# Patient Record
Sex: FEMALE | Race: WHITE | NOT HISPANIC OR LATINO | URBAN - METROPOLITAN AREA
[De-identification: names, ages, dates, MRNs, and addresses within clinical notes are randomized per-mention and may not be internally consistent; named-entity substitution may affect disease eponyms.]

---

## 2023-11-21 ENCOUNTER — APPOINTMENT (RX ONLY)
Dept: URBAN - METROPOLITAN AREA CLINIC 352 | Facility: CLINIC | Age: 53
Setting detail: DERMATOLOGY
End: 2023-11-21

## 2023-11-21 DIAGNOSIS — I83.9 ASYMPTOMATIC VARICOSE VEINS OF LOWER EXTREMITIES: ICD-10-CM

## 2023-11-21 DIAGNOSIS — Z41.9 ENCOUNTER FOR PROCEDURE FOR PURPOSES OTHER THAN REMEDYING HEALTH STATE, UNSPECIFIED: ICD-10-CM

## 2023-11-21 PROBLEM — I83.92 ASYMPTOMATIC VARICOSE VEINS OF LEFT LOWER EXTREMITY: Status: ACTIVE | Noted: 2023-11-21

## 2023-11-21 PROCEDURE — ? COSMETIC CONSULTATION: SCLEROTHERAPY

## 2023-11-21 PROCEDURE — ? SCLEROTHERAPY (COSMETIC)

## 2023-11-21 ASSESSMENT — LOCATION DETAILED DESCRIPTION DERM
LOCATION DETAILED: LEFT ANTERIOR PROXIMAL THIGH
LOCATION DETAILED: LEFT ANTERIOR DISTAL THIGH
LOCATION DETAILED: RIGHT ANTERIOR PROXIMAL THIGH

## 2023-11-21 ASSESSMENT — LOCATION SIMPLE DESCRIPTION DERM
LOCATION SIMPLE: LEFT THIGH
LOCATION SIMPLE: RIGHT THIGH

## 2023-11-21 ASSESSMENT — LOCATION ZONE DERM: LOCATION ZONE: LEG

## 2023-11-21 NOTE — PROCEDURE: SCLEROTHERAPY (COSMETIC)
Sclerosant Volume (Cc): 3
Detail Level: Simple
Sclerosant Volume (Cc): 10
Price (Use Numbers Only, No Special Characters Or $): 75
Number Of Injections (Sclerosant 2): 0
Number Of Injections (Sclerosant 1): 1
Consent was obtained with risks, benefits, and alternatives discussed for the above procedures.  Photographs were taken.
Disposition: Test treatment performed
Post-Care Instructions: Compression for 3 weeks is critical to optimize your recovery and results. Compliance with compression helps to prevent blood clots and minimizes pain, swelling, bruising, skin discoloration (staining) and the recurrence of vessels.       \nMaterials to gather for your wound care (all available over the counter)      \nCompression stockings x 2 pairs, 4 x 4 gauze, Comprilan wrap: 8 cm and 10 cm width wrap, Medical adhesive tape       \nCompression and Wound care;  Leg compression for 3 weeks is garces to your success and safety. Compression at night is only needed the first day. After that, compression is needed only during waking hours.  However, if your leg feels better with compression at night, then you may continue compression at night as tolerated.       \nAfter the sclerotherapy procedure, 2 layers compression will be placed.       \n1. On the skin, folded or flat gauze will cover the treated areas.       \n2. A compression wrap (Comprilan) will be wrapped around your leg over the gauze. Once the compression wrap is in place, a compression stocking will be worn. This two layer  compression (wrap plus stocking) should be worn for the first 24 hours if tolerated.       \n3. After 24 hours, remove all compressions and dressings and just wear the compression stockings only during waking hours.        \nYou will need to wear compression stockings for three weeks after your procedure, unless your physician instructs you otherwise.       \nActivity       \n - It is important NOT to be sitting or lying down for several hours after surgery. You may begin walking immediately after surgery. This is good for you, but take it easy.       \n - For 2 days after treatment: Avoid aerobic exercise, weight training, and all other types of exertion that increase your breathing and pulse rates.       \n - Do not get a tan for one month after sclerotherapy. Tanning increases your risk of skin discoloration.      \nBathing       \nAfter 24 hours, you may shower or bathe in tepid water, but keep the compression stocking on. Avoid immersion in hot tubs.      \nDiscomfort . For pain or discomfort:       \n - You may take acetaminophen (TylenolTM, Extra-Strength TylenolTM or DatrilTM) as directed.       \n - Do not use aspirin, products containing aspirin, or ibuprofen (for example AdvilTM or MotrinTM) for five days after your surgery, unless approved by your physician.       \n - Dietary restrictions Do not drink alcoholic beverages for two days after your sclerotherapy procedure.       \nPossible side effects following sclerotherapy  After sclerotherapy, mild swelling is expected. The injection sites may also become bruised or gray. You may also experience one or more of the following side effects, which almost always go away within one to four months:       \n - Darkening of the injected veins       \n - Brownish staining of the skin       \n - Small clotted vessels under the surface of the skin that you can feel      \n - Bruising of the injection sites       \nWhat to do about bruising  This will resolve within 2-3 weeks. If you wish the bruising to disappear sooner, then applying Arnicare cream (over the counter, health food stores) will help.       \nWhat to do if you feel small clotted vessels under the surface of the skin.      \n - Call us for a follow up appointment. These small clots can be drained through a small nick.       \n - Draining these small clots will help you heal faster and with less discoloration.      \nWhen to contact your physician       \nContact your physician if you experience any of the following:       \n - Treated areas become increasingly sore, tender, red or warm       \n - Acetaminophen does not relieve your discomfort       \n - Injection sites turn black or the skin around the site breaks down       \n - Ulceration of the injection sites       \n - You develop blisters from the tape       \n - You develop significant swelling or pain in the leg       \n - Darkening of large areas of the skin or foot

## 2023-12-12 ENCOUNTER — APPOINTMENT (RX ONLY)
Dept: URBAN - METROPOLITAN AREA CLINIC 352 | Facility: CLINIC | Age: 53
Setting detail: DERMATOLOGY
End: 2023-12-12

## 2023-12-12 DIAGNOSIS — I83.9 ASYMPTOMATIC VARICOSE VEINS OF LOWER EXTREMITIES: ICD-10-CM

## 2023-12-12 PROBLEM — I83.92 ASYMPTOMATIC VARICOSE VEINS OF LEFT LOWER EXTREMITY: Status: ACTIVE | Noted: 2023-12-12

## 2023-12-12 PROCEDURE — ? SCLEROTHERAPY (COSMETIC)

## 2023-12-12 ASSESSMENT — LOCATION SIMPLE DESCRIPTION DERM: LOCATION SIMPLE: LEFT THIGH

## 2023-12-12 ASSESSMENT — LOCATION DETAILED DESCRIPTION DERM
LOCATION DETAILED: LEFT ANTERIOR DISTAL THIGH
LOCATION DETAILED: LEFT ANTERIOR PROXIMAL THIGH

## 2023-12-12 ASSESSMENT — LOCATION ZONE DERM: LOCATION ZONE: LEG

## 2023-12-12 NOTE — PROCEDURE: SCLEROTHERAPY (COSMETIC)
Sclerosant Volume (Cc): 2
Detail Level: Simple
Sclerosant Volume (Cc): 10
Price (Use Numbers Only, No Special Characters Or $): 400
Number Of Injections (Sclerosant 2): 0
Number Of Injections (Sclerosant 1): 1
Consent was obtained with risks, benefits, and alternatives discussed for the above procedures.  Photographs were taken.
Post-Care Instructions: Compression for 3 weeks is critical to optimize your recovery and results. Compliance with compression helps to prevent blood clots and minimizes pain, swelling, bruising, skin discoloration (staining) and the recurrence of vessels.       \nMaterials to gather for your wound care (all available over the counter)      \nCompression stockings x 2 pairs, 4 x 4 gauze, Comprilan wrap: 8 cm and 10 cm width wrap, Medical adhesive tape       \nCompression and Wound care;  Leg compression for 3 weeks is garces to your success and safety. Compression at night is only needed the first day. After that, compression is needed only during waking hours.  However, if your leg feels better with compression at night, then you may continue compression at night as tolerated.       \nAfter the sclerotherapy procedure, 2 layers compression will be placed.       \n1. On the skin, folded or flat gauze will cover the treated areas.       \n2. A compression wrap (Comprilan) will be wrapped around your leg over the gauze. Once the compression wrap is in place, a compression stocking will be worn. This two layer  compression (wrap plus stocking) should be worn for the first 24 hours if tolerated.       \n3. After 24 hours, remove all compressions and dressings and just wear the compression stockings only during waking hours.        \nYou will need to wear compression stockings for three weeks after your procedure, unless your physician instructs you otherwise.       \nActivity       \n - It is important NOT to be sitting or lying down for several hours after surgery. You may begin walking immediately after surgery. This is good for you, but take it easy.       \n - For 2 days after treatment: Avoid aerobic exercise, weight training, and all other types of exertion that increase your breathing and pulse rates.       \n - Do not get a tan for one month after sclerotherapy. Tanning increases your risk of skin discoloration.      \nBathing       \nAfter 24 hours, you may shower or bathe in tepid water, but keep the compression stocking on. Avoid immersion in hot tubs.      \nDiscomfort . For pain or discomfort:       \n - You may take acetaminophen (TylenolTM, Extra-Strength TylenolTM or DatrilTM) as directed.       \n - Do not use aspirin, products containing aspirin, or ibuprofen (for example AdvilTM or MotrinTM) for five days after your surgery, unless approved by your physician.       \n - Dietary restrictions Do not drink alcoholic beverages for two days after your sclerotherapy procedure.       \nPossible side effects following sclerotherapy  After sclerotherapy, mild swelling is expected. The injection sites may also become bruised or gray. You may also experience one or more of the following side effects, which almost always go away within one to four months:       \n - Darkening of the injected veins       \n - Brownish staining of the skin       \n - Small clotted vessels under the surface of the skin that you can feel      \n - Bruising of the injection sites       \nWhat to do about bruising  This will resolve within 2-3 weeks. If you wish the bruising to disappear sooner, then applying Arnicare cream (over the counter, health food stores) will help.       \nWhat to do if you feel small clotted vessels under the surface of the skin.      \n - Call us for a follow up appointment. These small clots can be drained through a small nick.       \n - Draining these small clots will help you heal faster and with less discoloration.      \nWhen to contact your physician       \nContact your physician if you experience any of the following:       \n - Treated areas become increasingly sore, tender, red or warm       \n - Acetaminophen does not relieve your discomfort       \n - Injection sites turn black or the skin around the site breaks down       \n - Ulceration of the injection sites       \n - You develop blisters from the tape       \n - You develop significant swelling or pain in the leg       \n - Darkening of large areas of the skin or foot

## 2024-01-30 ENCOUNTER — APPOINTMENT (RX ONLY)
Dept: URBAN - METROPOLITAN AREA CLINIC 352 | Facility: CLINIC | Age: 54
Setting detail: DERMATOLOGY
End: 2024-01-30

## 2024-01-30 DIAGNOSIS — I83.9 ASYMPTOMATIC VARICOSE VEINS OF LOWER EXTREMITIES: ICD-10-CM

## 2024-01-30 PROBLEM — I83.93 ASYMPTOMATIC VARICOSE VEINS OF BILATERAL LOWER EXTREMITIES: Status: ACTIVE | Noted: 2024-01-30

## 2024-01-30 PROCEDURE — ? SCLEROTHERAPY (COSMETIC)

## 2024-01-30 ASSESSMENT — LOCATION SIMPLE DESCRIPTION DERM
LOCATION SIMPLE: LEFT PRETIBIAL REGION
LOCATION SIMPLE: RIGHT PRETIBIAL REGION

## 2024-01-30 ASSESSMENT — LOCATION ZONE DERM: LOCATION ZONE: LEG

## 2024-01-30 ASSESSMENT — LOCATION DETAILED DESCRIPTION DERM
LOCATION DETAILED: RIGHT PROXIMAL PRETIBIAL REGION
LOCATION DETAILED: LEFT PROXIMAL PRETIBIAL REGION

## 2024-01-30 NOTE — PROCEDURE: SCLEROTHERAPY (COSMETIC)
Sclerosant Volume (Cc): 10
Detail Level: Simple
Price (Use Numbers Only, No Special Characters Or $): 150
Sclerosant Volume (Cc): 3
Post-Care Instructions: Compression for 3 weeks is critical to optimize your recovery and results. Compliance with compression helps to prevent blood clots and minimizes pain, swelling, bruising, skin discoloration (staining) and the recurrence of vessels.       \nMaterials to gather for your wound care (all available over the counter)      \nCompression stockings x 2 pairs, 4 x 4 gauze, Comprilan wrap: 8 cm and 10 cm width wrap, Medical adhesive tape       \nCompression and Wound care;  Leg compression for 3 weeks is garces to your success and safety. Compression at night is only needed the first day. After that, compression is needed only during waking hours.  However, if your leg feels better with compression at night, then you may continue compression at night as tolerated.       \nAfter the sclerotherapy procedure, 2 layers compression will be placed.       \n1. On the skin, folded or flat gauze will cover the treated areas.       \n2. A compression wrap (Comprilan) will be wrapped around your leg over the gauze. Once the compression wrap is in place, a compression stocking will be worn. This two layer  compression (wrap plus stocking) should be worn for the first 24 hours if tolerated.       \n3. After 24 hours, remove all compressions and dressings and just wear the compression stockings only during waking hours.        \nYou will need to wear compression stockings for three weeks after your procedure, unless your physician instructs you otherwise.       \nActivity       \n - It is important NOT to be sitting or lying down for several hours after surgery. You may begin walking immediately after surgery. This is good for you, but take it easy.       \n - For 2 days after treatment: Avoid aerobic exercise, weight training, and all other types of exertion that increase your breathing and pulse rates.       \n - Do not get a tan for one month after sclerotherapy. Tanning increases your risk of skin discoloration.      \nBathing       \nAfter 24 hours, you may shower or bathe in tepid water, but keep the compression stocking on. Avoid immersion in hot tubs.      \nDiscomfort . For pain or discomfort:       \n - You may take acetaminophen (TylenolTM, Extra-Strength TylenolTM or DatrilTM) as directed.       \n - Do not use aspirin, products containing aspirin, or ibuprofen (for example AdvilTM or MotrinTM) for five days after your surgery, unless approved by your physician.       \n - Dietary restrictions Do not drink alcoholic beverages for two days after your sclerotherapy procedure.       \nPossible side effects following sclerotherapy  After sclerotherapy, mild swelling is expected. The injection sites may also become bruised or gray. You may also experience one or more of the following side effects, which almost always go away within one to four months:       \n - Darkening of the injected veins       \n - Brownish staining of the skin       \n - Small clotted vessels under the surface of the skin that you can feel      \n - Bruising of the injection sites       \nWhat to do about bruising  This will resolve within 2-3 weeks. If you wish the bruising to disappear sooner, then applying Arnicare cream (over the counter, health food stores) will help.       \nWhat to do if you feel small clotted vessels under the surface of the skin.      \n - Call us for a follow up appointment. These small clots can be drained through a small nick.       \n - Draining these small clots will help you heal faster and with less discoloration.      \nWhen to contact your physician       \nContact your physician if you experience any of the following:       \n - Treated areas become increasingly sore, tender, red or warm       \n - Acetaminophen does not relieve your discomfort       \n - Injection sites turn black or the skin around the site breaks down       \n - Ulceration of the injection sites       \n - You develop blisters from the tape       \n - You develop significant swelling or pain in the leg       \n - Darkening of large areas of the skin or foot
Disposition: Compression stockings applied post treatment.
Number Of Injections (Sclerosant 3): 0
Number Of Injections (Sclerosant 1): 4
Consent was obtained with risks, benefits, and alternatives discussed for the above procedures.  Photographs were taken.

## 2024-03-14 ENCOUNTER — APPOINTMENT (RX ONLY)
Dept: URBAN - METROPOLITAN AREA CLINIC 352 | Facility: CLINIC | Age: 54
Setting detail: DERMATOLOGY
End: 2024-03-14

## 2024-03-14 DIAGNOSIS — I83.9 ASYMPTOMATIC VARICOSE VEINS OF LOWER EXTREMITIES: ICD-10-CM

## 2024-03-14 PROBLEM — I83.93 ASYMPTOMATIC VARICOSE VEINS OF BILATERAL LOWER EXTREMITIES: Status: ACTIVE | Noted: 2024-03-14

## 2024-03-14 PROCEDURE — ? SCLEROTHERAPY (COSMETIC)

## 2024-03-14 ASSESSMENT — LOCATION SIMPLE DESCRIPTION DERM
LOCATION SIMPLE: RIGHT PRETIBIAL REGION
LOCATION SIMPLE: LEFT PRETIBIAL REGION

## 2024-03-14 ASSESSMENT — LOCATION ZONE DERM: LOCATION ZONE: LEG

## 2024-07-18 ENCOUNTER — APPOINTMENT (RX ONLY)
Dept: URBAN - METROPOLITAN AREA CLINIC 352 | Facility: CLINIC | Age: 54
Setting detail: DERMATOLOGY
End: 2024-07-18

## 2024-07-18 DIAGNOSIS — I83.9 ASYMPTOMATIC VARICOSE VEINS OF LOWER EXTREMITIES: ICD-10-CM

## 2024-07-18 PROBLEM — I83.93 ASYMPTOMATIC VARICOSE VEINS OF BILATERAL LOWER EXTREMITIES: Status: ACTIVE | Noted: 2024-07-18

## 2024-07-18 PROCEDURE — ? SCLEROTHERAPY (COSMETIC)

## 2024-07-18 ASSESSMENT — LOCATION DETAILED DESCRIPTION DERM
LOCATION DETAILED: RIGHT DISTAL PRETIBIAL REGION
LOCATION DETAILED: LEFT DISTAL PRETIBIAL REGION

## 2024-07-18 ASSESSMENT — LOCATION SIMPLE DESCRIPTION DERM
LOCATION SIMPLE: RIGHT PRETIBIAL REGION
LOCATION SIMPLE: LEFT PRETIBIAL REGION

## 2024-07-18 ASSESSMENT — LOCATION ZONE DERM: LOCATION ZONE: LEG

## 2024-07-18 NOTE — PROCEDURE: SCLEROTHERAPY (COSMETIC)
Post-Care Instructions: Compression for 3 weeks is critical to optimize your recovery and results. Compliance with compression helps to prevent blood clots and minimizes pain, swelling, bruising, skin discoloration (staining) and the recurrence of vessels.       \nMaterials to gather for your wound care (all available over the counter)      \nCompression stockings x 2 pairs, 4 x 4 gauze, Comprilan wrap: 8 cm and 10 cm width wrap, Medical adhesive tape       \nCompression and Wound care;  Leg compression for 3 weeks is garces to your success and safety. Compression at night is only needed the first day. After that, compression is needed only during waking hours.  However, if your leg feels better with compression at night, then you may continue compression at night as tolerated.       \nAfter the sclerotherapy procedure, 2 layers compression will be placed.       \n1. On the skin, folded or flat gauze will cover the treated areas.       \n2. A compression wrap (Comprilan) will be wrapped around your leg over the gauze. Once the compression wrap is in place, a compression stocking will be worn. This two layer  compression (wrap plus stocking) should be worn for the first 24 hours if tolerated.       \n3. After 24 hours, remove all compressions and dressings and just wear the compression stockings only during waking hours.        \nYou will need to wear compression stockings for three weeks after your procedure, unless your physician instructs you otherwise.       \nActivity       \n - It is important NOT to be sitting or lying down for several hours after surgery. You may begin walking immediately after surgery. This is good for you, but take it easy.       \n - For 2 days after treatment: Avoid aerobic exercise, weight training, and all other types of exertion that increase your breathing and pulse rates.       \n - Do not get a tan for one month after sclerotherapy. Tanning increases your risk of skin discoloration.      \nBathing       \nAfter 24 hours, you may shower or bathe in tepid water, but keep the compression stocking on. Avoid immersion in hot tubs.      \nDiscomfort . For pain or discomfort:       \n - You may take acetaminophen (TylenolTM, Extra-Strength TylenolTM or DatrilTM) as directed.       \n - Do not use aspirin, products containing aspirin, or ibuprofen (for example AdvilTM or MotrinTM) for five days after your surgery, unless approved by your physician.       \n - Dietary restrictions Do not drink alcoholic beverages for two days after your sclerotherapy procedure.       \nPossible side effects following sclerotherapy  After sclerotherapy, mild swelling is expected. The injection sites may also become bruised or gray. You may also experience one or more of the following side effects, which almost always go away within one to four months:       \n - Darkening of the injected veins       \n - Brownish staining of the skin       \n - Small clotted vessels under the surface of the skin that you can feel      \n - Bruising of the injection sites       \nWhat to do about bruising  This will resolve within 2-3 weeks. If you wish the bruising to disappear sooner, then applying Arnicare cream (over the counter, health food stores) will help.       \nWhat to do if you feel small clotted vessels under the surface of the skin.      \n - Call us for a follow up appointment. These small clots can be drained through a small nick.       \n - Draining these small clots will help you heal faster and with less discoloration.      \nWhen to contact your physician       \nContact your physician if you experience any of the following:       \n - Treated areas become increasingly sore, tender, red or warm       \n - Acetaminophen does not relieve your discomfort       \n - Injection sites turn black or the skin around the site breaks down       \n - Ulceration of the injection sites       \n - You develop blisters from the tape       \n - You develop significant swelling or pain in the leg       \n - Darkening of large areas of the skin or foot
Sclerosant Volume (Cc): 3
Number Of Injections (Sclerosant 3): 0
Price (Use Numbers Only, No Special Characters Or $): 100
Consent was obtained with risks, benefits, and alternatives discussed for the above procedures.  Photographs were taken.
Detail Level: Simple
Sclerosant Volume (Cc): 10
Disposition: Compression stockings applied post treatment.

## 2024-10-17 ENCOUNTER — APPOINTMENT (RX ONLY)
Dept: URBAN - METROPOLITAN AREA CLINIC 352 | Facility: CLINIC | Age: 54
Setting detail: DERMATOLOGY
End: 2024-10-17

## 2024-10-17 DIAGNOSIS — I83.9 ASYMPTOMATIC VARICOSE VEINS OF LOWER EXTREMITIES: ICD-10-CM

## 2024-10-17 PROBLEM — I83.93 ASYMPTOMATIC VARICOSE VEINS OF BILATERAL LOWER EXTREMITIES: Status: ACTIVE | Noted: 2024-10-17

## 2024-10-17 PROCEDURE — ? SCLEROTHERAPY (COSMETIC)

## 2024-10-17 ASSESSMENT — LOCATION SIMPLE DESCRIPTION DERM
LOCATION SIMPLE: RIGHT PRETIBIAL REGION
LOCATION SIMPLE: LEFT THIGH
LOCATION SIMPLE: RIGHT THIGH
LOCATION SIMPLE: LEFT PRETIBIAL REGION

## 2024-10-17 ASSESSMENT — LOCATION DETAILED DESCRIPTION DERM
LOCATION DETAILED: LEFT LATERAL PROXIMAL PRETIBIAL REGION
LOCATION DETAILED: LEFT ANTERIOR DISTAL THIGH
LOCATION DETAILED: RIGHT PROXIMAL PRETIBIAL REGION
LOCATION DETAILED: RIGHT ANTERIOR DISTAL THIGH

## 2024-10-17 ASSESSMENT — LOCATION ZONE DERM: LOCATION ZONE: LEG

## 2024-10-17 NOTE — PROCEDURE: SCLEROTHERAPY (COSMETIC)
Sclerosant Volume (Cc): 10
Price (Use Numbers Only, No Special Characters Or $): 250.00
Number Of Injections (Sclerosant 3): 0
Post-Care Instructions: Compression for 3 weeks is critical to optimize your recovery and results. Compliance with compression helps to prevent blood clots and minimizes pain, swelling, bruising, skin discoloration (staining) and the recurrence of vessels.       \nMaterials to gather for your wound care (all available over the counter)      \nCompression stockings x 2 pairs, 4 x 4 gauze, Comprilan wrap: 8 cm and 10 cm width wrap, Medical adhesive tape       \nCompression and Wound care;  Leg compression for 3 weeks is garces to your success and safety. Compression at night is only needed the first day. After that, compression is needed only during waking hours.  However, if your leg feels better with compression at night, then you may continue compression at night as tolerated.       \nAfter the sclerotherapy procedure, 2 layers compression will be placed.       \n1. On the skin, folded or flat gauze will cover the treated areas.       \n2. A compression wrap (Comprilan) will be wrapped around your leg over the gauze. Once the compression wrap is in place, a compression stocking will be worn. This two layer  compression (wrap plus stocking) should be worn for the first 24 hours if tolerated.       \n3. After 24 hours, remove all compressions and dressings and just wear the compression stockings only during waking hours.        \nYou will need to wear compression stockings for three weeks after your procedure, unless your physician instructs you otherwise.       \nActivity       \n - It is important NOT to be sitting or lying down for several hours after surgery. You may begin walking immediately after surgery. This is good for you, but take it easy.       \n - For 2 days after treatment: Avoid aerobic exercise, weight training, and all other types of exertion that increase your breathing and pulse rates.       \n - Do not get a tan for one month after sclerotherapy. Tanning increases your risk of skin discoloration.      \nBathing       \nAfter 24 hours, you may shower or bathe in tepid water, but keep the compression stocking on. Avoid immersion in hot tubs.      \nDiscomfort . For pain or discomfort:       \n - You may take acetaminophen (TylenolTM, Extra-Strength TylenolTM or DatrilTM) as directed.       \n - Do not use aspirin, products containing aspirin, or ibuprofen (for example AdvilTM or MotrinTM) for five days after your surgery, unless approved by your physician.       \n - Dietary restrictions Do not drink alcoholic beverages for two days after your sclerotherapy procedure.       \nPossible side effects following sclerotherapy  After sclerotherapy, mild swelling is expected. The injection sites may also become bruised or gray. You may also experience one or more of the following side effects, which almost always go away within one to four months:       \n - Darkening of the injected veins       \n - Brownish staining of the skin       \n - Small clotted vessels under the surface of the skin that you can feel      \n - Bruising of the injection sites       \nWhat to do about bruising  This will resolve within 2-3 weeks. If you wish the bruising to disappear sooner, then applying Arnicare cream (over the counter, health food stores) will help.       \nWhat to do if you feel small clotted vessels under the surface of the skin.      \n - Call us for a follow up appointment. These small clots can be drained through a small nick.       \n - Draining these small clots will help you heal faster and with less discoloration.      \nWhen to contact your physician       \nContact your physician if you experience any of the following:       \n - Treated areas become increasingly sore, tender, red or warm       \n - Acetaminophen does not relieve your discomfort       \n - Injection sites turn black or the skin around the site breaks down       \n - Ulceration of the injection sites       \n - You develop blisters from the tape       \n - You develop significant swelling or pain in the leg       \n - Darkening of large areas of the skin or foot
Detail Level: Simple
Sclerosant Volume (Cc): 3
Disposition: Compression stockings applied post treatment.
Consent was obtained with risks, benefits, and alternatives discussed for the above procedures.  Photographs were taken.

## 2024-11-08 ENCOUNTER — APPOINTMENT (RX ONLY)
Dept: URBAN - METROPOLITAN AREA CLINIC 327 | Facility: CLINIC | Age: 54
Setting detail: DERMATOLOGY
End: 2024-11-08

## 2024-11-08 DIAGNOSIS — R23.3 SPONTANEOUS ECCHYMOSES: ICD-10-CM

## 2024-11-08 PROCEDURE — ? PULSED-DYE LASER

## 2024-11-08 ASSESSMENT — LOCATION ZONE DERM: LOCATION ZONE: LEG

## 2024-11-08 ASSESSMENT — LOCATION SIMPLE DESCRIPTION DERM
LOCATION SIMPLE: LEFT THIGH
LOCATION SIMPLE: RIGHT THIGH

## 2024-11-08 ASSESSMENT — LOCATION DETAILED DESCRIPTION DERM
LOCATION DETAILED: RIGHT ANTERIOR DISTAL THIGH
LOCATION DETAILED: LEFT ANTERIOR DISTAL THIGH
LOCATION DETAILED: RIGHT ANTERIOR PROXIMAL THIGH
LOCATION DETAILED: LEFT ANTERIOR PROXIMAL THIGH

## 2024-11-08 NOTE — PROCEDURE: PULSED-DYE LASER
Cryogen Time (Ms): 30
Treated Area: medium area
Delay Time (Ms): 20
Pulse Duration: 10 ms
Post-Procedure Care: Vaseline and ice applied. Post care reviewed with patient.
Fluence In J/Cm2 (Optional): 9.00
Detail Level: Simple
Price (Use Numbers Only, No Special Characters Or $): 150
Spot Size: 7 mm
Laser Type: Vbeam 595nm
Post-Care Instructions: I reviewed with the patient in detail post-care instructions. Patient should stay away from the sun and wear sun protection until treated areas are fully healed.
Consent: Written consent obtained, risks reviewed including but not limited to crusting, scabbing, blistering, scarring, darker or lighter pigmentary change, incidental hair removal, bruising, and/or incomplete removal.
Immediate Endpoint: erythema

## 2025-01-02 ENCOUNTER — APPOINTMENT (OUTPATIENT)
Dept: URBAN - METROPOLITAN AREA CLINIC 327 | Facility: CLINIC | Age: 55
Setting detail: DERMATOLOGY
End: 2025-01-02

## 2025-01-02 DIAGNOSIS — R23.3 SPONTANEOUS ECCHYMOSES: ICD-10-CM

## 2025-01-02 PROCEDURE — ? PULSED-DYE LASER

## 2025-01-02 ASSESSMENT — LOCATION DETAILED DESCRIPTION DERM
LOCATION DETAILED: RIGHT ANTERIOR DISTAL THIGH
LOCATION DETAILED: LEFT ANTERIOR DISTAL THIGH

## 2025-01-02 ASSESSMENT — LOCATION ZONE DERM: LOCATION ZONE: LEG

## 2025-01-02 ASSESSMENT — LOCATION SIMPLE DESCRIPTION DERM
LOCATION SIMPLE: RIGHT THIGH
LOCATION SIMPLE: LEFT THIGH

## 2025-01-02 ASSESSMENT — BSA RASH: BSA RASH: 4

## 2025-01-02 NOTE — PROCEDURE: PULSED-DYE LASER
Cryogen Time (Ms): 30
Spot Size: 7 mm
Pulse Duration: 10 ms
Post-Procedure Care: Vaseline and ice applied. Post care reviewed with patient.
Consent: Written consent obtained, risks reviewed including but not limited to crusting, scabbing, blistering, scarring, darker or lighter pigmentary change, incidental hair removal, bruising, and/or incomplete removal.
Post-Care Instructions: I reviewed with the patient in detail post-care instructions. Patient should stay away from the sun and wear sun protection until treated areas are fully healed.
Delay Time (Ms): 20
Price (Use Numbers Only, No Special Characters Or $): 150
Detail Level: Zone
Spot Size: 10 mm
Laser Type: Vbeam 595nm
Cryogen Time (Ms): 10

## 2025-02-20 ENCOUNTER — APPOINTMENT (OUTPATIENT)
Dept: URBAN - METROPOLITAN AREA CLINIC 327 | Facility: CLINIC | Age: 55
Setting detail: DERMATOLOGY
End: 2025-02-20

## 2025-02-20 DIAGNOSIS — R23.3 SPONTANEOUS ECCHYMOSES: ICD-10-CM

## 2025-02-20 PROCEDURE — ? PULSED-DYE LASER

## 2025-02-20 ASSESSMENT — LOCATION SIMPLE DESCRIPTION DERM
LOCATION SIMPLE: LEFT THIGH
LOCATION SIMPLE: RIGHT THIGH

## 2025-02-20 ASSESSMENT — LOCATION ZONE DERM: LOCATION ZONE: LEG

## 2025-02-20 ASSESSMENT — LOCATION DETAILED DESCRIPTION DERM
LOCATION DETAILED: RIGHT ANTERIOR DISTAL THIGH
LOCATION DETAILED: LEFT ANTERIOR DISTAL THIGH

## 2025-02-20 NOTE — PROCEDURE: PULSED-DYE LASER
Treated Area: small area
Cryogen Time (Ms): 30
Spot Size: 7 mm
Pulse Duration: 10 ms
Post-Procedure Care: Vaseline and ice applied. Post care reviewed with patient.
Consent: Written consent obtained, risks reviewed including but not limited to crusting, scabbing, blistering, scarring, darker or lighter pigmentary change, incidental hair removal, bruising, and/or incomplete removal.
Post-Care Instructions: I reviewed with the patient in detail post-care instructions. Patient should stay away from the sun and wear sun protection until treated areas are fully healed.
Delay Time (Ms): 20
Price (Use Numbers Only, No Special Characters Or $): 150
Detail Level: Simple
Laser Type: Vbeam 595nm
Cryogen Time (Ms): 10
Immediate Endpoint: erythema

## 2025-03-27 ENCOUNTER — APPOINTMENT (OUTPATIENT)
Dept: URBAN - METROPOLITAN AREA CLINIC 352 | Facility: CLINIC | Age: 55
Setting detail: DERMATOLOGY
End: 2025-03-27

## 2025-03-27 DIAGNOSIS — R23.3 SPONTANEOUS ECCHYMOSES: ICD-10-CM

## 2025-03-27 PROCEDURE — ? PULSED-DYE LASER

## 2025-03-27 ASSESSMENT — LOCATION ZONE DERM: LOCATION ZONE: LEG

## 2025-03-27 ASSESSMENT — LOCATION DETAILED DESCRIPTION DERM
LOCATION DETAILED: RIGHT ANTERIOR DISTAL THIGH
LOCATION DETAILED: LEFT ANTERIOR DISTAL THIGH

## 2025-03-27 ASSESSMENT — LOCATION SIMPLE DESCRIPTION DERM
LOCATION SIMPLE: LEFT THIGH
LOCATION SIMPLE: RIGHT THIGH

## 2025-06-04 ENCOUNTER — APPOINTMENT (OUTPATIENT)
Dept: URBAN - METROPOLITAN AREA CLINIC 352 | Facility: CLINIC | Age: 55
Setting detail: DERMATOLOGY
End: 2025-06-04

## 2025-06-04 DIAGNOSIS — I83.9 ASYMPTOMATIC VARICOSE VEINS OF LOWER EXTREMITIES: ICD-10-CM

## 2025-06-04 PROBLEM — I83.93 ASYMPTOMATIC VARICOSE VEINS OF BILATERAL LOWER EXTREMITIES: Status: ACTIVE | Noted: 2025-06-04

## 2025-06-04 PROCEDURE — ? SCLEROTHERAPY (COSMETIC)

## 2025-06-04 ASSESSMENT — LOCATION SIMPLE DESCRIPTION DERM
LOCATION SIMPLE: RIGHT PRETIBIAL REGION
LOCATION SIMPLE: LEFT PRETIBIAL REGION

## 2025-06-04 ASSESSMENT — LOCATION ZONE DERM: LOCATION ZONE: LEG

## 2025-06-04 ASSESSMENT — LOCATION DETAILED DESCRIPTION DERM
LOCATION DETAILED: LEFT PROXIMAL PRETIBIAL REGION
LOCATION DETAILED: RIGHT PROXIMAL PRETIBIAL REGION

## 2025-06-04 NOTE — PROCEDURE: SCLEROTHERAPY (COSMETIC)
Post-Care Instructions: Compression for 3 weeks is critical to optimize your recovery and results. Compliance with compression helps to prevent blood clots and minimizes pain, swelling, bruising, skin discoloration (staining) and the recurrence of vessels.       \nMaterials to gather for your wound care (all available over the counter)      \nCompression stockings x 2 pairs, 4 x 4 gauze, Comprilan wrap: 8 cm and 10 cm width wrap, Medical adhesive tape       \nCompression and Wound care;  Leg compression for 3 weeks is garces to your success and safety. Compression at night is only needed the first day. After that, compression is needed only during waking hours.  However, if your leg feels better with compression at night, then you may continue compression at night as tolerated.       \nAfter the sclerotherapy procedure, 2 layers compression will be placed.       \n1. On the skin, folded or flat gauze will cover the treated areas.       \n2. A compression wrap (Comprilan) will be wrapped around your leg over the gauze. Once the compression wrap is in place, a compression stocking will be worn. This two layer  compression (wrap plus stocking) should be worn for the first 24 hours if tolerated.       \n3. After 24 hours, remove all compressions and dressings and just wear the compression stockings only during waking hours.        \nYou will need to wear compression stockings for three weeks after your procedure, unless your physician instructs you otherwise.       \nActivity       \n - It is important NOT to be sitting or lying down for several hours after surgery. You may begin walking immediately after surgery. This is good for you, but take it easy.       \n - For 2 days after treatment: Avoid aerobic exercise, weight training, and all other types of exertion that increase your breathing and pulse rates.       \n - Do not get a tan for one month after sclerotherapy. Tanning increases your risk of skin discoloration.      \nBathing       \nAfter 24 hours, you may shower or bathe in tepid water, but keep the compression stocking on. Avoid immersion in hot tubs.      \nDiscomfort . For pain or discomfort:       \n - You may take acetaminophen (TylenolTM, Extra-Strength TylenolTM or DatrilTM) as directed.       \n - Do not use aspirin, products containing aspirin, or ibuprofen (for example AdvilTM or MotrinTM) for five days after your surgery, unless approved by your physician.       \n - Dietary restrictions Do not drink alcoholic beverages for two days after your sclerotherapy procedure.       \nPossible side effects following sclerotherapy  After sclerotherapy, mild swelling is expected. The injection sites may also become bruised or gray. You may also experience one or more of the following side effects, which almost always go away within one to four months:       \n - Darkening of the injected veins       \n - Brownish staining of the skin       \n - Small clotted vessels under the surface of the skin that you can feel      \n - Bruising of the injection sites       \nWhat to do about bruising  This will resolve within 2-3 weeks. If you wish the bruising to disappear sooner, then applying Arnicare cream (over the counter, health food stores) will help.       \nWhat to do if you feel small clotted vessels under the surface of the skin.      \n - Call us for a follow up appointment. These small clots can be drained through a small nick.       \n - Draining these small clots will help you heal faster and with less discoloration.      \nWhen to contact your physician       \nContact your physician if you experience any of the following:       \n - Treated areas become increasingly sore, tender, red or warm       \n - Acetaminophen does not relieve your discomfort       \n - Injection sites turn black or the skin around the site breaks down       \n - Ulceration of the injection sites       \n - You develop blisters from the tape       \n - You develop significant swelling or pain in the leg       \n - Darkening of large areas of the skin or foot
Sclerosant Volume (Cc): 10
Consent was obtained with risks, benefits, and alternatives discussed for the above procedures.  Photographs were taken.
Detail Level: Detailed
Price (Use Numbers Only, No Special Characters Or $): 150
Disposition: Compression stockings applied post treatment.
Number Of Injections (Sclerosant 3): 0

## 2025-06-12 ENCOUNTER — APPOINTMENT (OUTPATIENT)
Dept: URBAN - METROPOLITAN AREA CLINIC 327 | Facility: CLINIC | Age: 55
Setting detail: DERMATOLOGY
End: 2025-06-12

## 2025-06-12 DIAGNOSIS — R23.3 SPONTANEOUS ECCHYMOSES: ICD-10-CM

## 2025-06-12 PROCEDURE — ? PULSED-DYE LASER

## 2025-06-12 ASSESSMENT — LOCATION SIMPLE DESCRIPTION DERM
LOCATION SIMPLE: RIGHT THIGH
LOCATION SIMPLE: LEFT THIGH

## 2025-06-12 ASSESSMENT — LOCATION DETAILED DESCRIPTION DERM
LOCATION DETAILED: RIGHT ANTERIOR DISTAL THIGH
LOCATION DETAILED: LEFT ANTERIOR DISTAL THIGH

## 2025-06-12 ASSESSMENT — LOCATION ZONE DERM: LOCATION ZONE: LEG

## 2025-07-03 ENCOUNTER — APPOINTMENT (OUTPATIENT)
Dept: URBAN - METROPOLITAN AREA CLINIC 327 | Facility: CLINIC | Age: 55
Setting detail: DERMATOLOGY
End: 2025-07-03

## 2025-07-03 DIAGNOSIS — I83.9 ASYMPTOMATIC VARICOSE VEINS OF LOWER EXTREMITIES: ICD-10-CM

## 2025-07-03 PROBLEM — I83.93 ASYMPTOMATIC VARICOSE VEINS OF BILATERAL LOWER EXTREMITIES: Status: ACTIVE | Noted: 2025-07-03

## 2025-07-03 PROCEDURE — ? SCLEROTHERAPY (COSMETIC)

## 2025-07-03 ASSESSMENT — LOCATION DETAILED DESCRIPTION DERM
LOCATION DETAILED: RIGHT PROXIMAL CALF
LOCATION DETAILED: RIGHT PROXIMAL PRETIBIAL REGION
LOCATION DETAILED: LEFT PROXIMAL PRETIBIAL REGION
LOCATION DETAILED: LEFT PROXIMAL CALF

## 2025-07-03 ASSESSMENT — LOCATION SIMPLE DESCRIPTION DERM
LOCATION SIMPLE: RIGHT PRETIBIAL REGION
LOCATION SIMPLE: LEFT CALF
LOCATION SIMPLE: RIGHT CALF
LOCATION SIMPLE: LEFT PRETIBIAL REGION

## 2025-07-03 ASSESSMENT — LOCATION ZONE DERM: LOCATION ZONE: LEG

## 2025-07-03 NOTE — PROCEDURE: SCLEROTHERAPY (COSMETIC)
Sclerosant Volume (Cc): 10
Disposition: Compression stockings applied post treatment.
Price (Use Numbers Only, No Special Characters Or $): 150
Consent was obtained with risks, benefits, and alternatives discussed for the above procedures.  Photographs were taken.
Detail Level: Detailed
Post-Care Instructions: Compression for 3 weeks is critical to optimize your recovery and results. Compliance with compression helps to prevent blood clots and minimizes pain, swelling, bruising, skin discoloration (staining) and the recurrence of vessels.       \nMaterials to gather for your wound care (all available over the counter)      \nCompression stockings x 2 pairs, 4 x 4 gauze, Comprilan wrap: 8 cm and 10 cm width wrap, Medical adhesive tape       \nCompression and Wound care;  Leg compression for 3 weeks is garces to your success and safety. Compression at night is only needed the first day. After that, compression is needed only during waking hours.  However, if your leg feels better with compression at night, then you may continue compression at night as tolerated.       \nAfter the sclerotherapy procedure, 2 layers compression will be placed.       \n1. On the skin, folded or flat gauze will cover the treated areas.       \n2. A compression wrap (Comprilan) will be wrapped around your leg over the gauze. Once the compression wrap is in place, a compression stocking will be worn. This two layer  compression (wrap plus stocking) should be worn for the first 24 hours if tolerated.       \n3. After 24 hours, remove all compressions and dressings and just wear the compression stockings only during waking hours.        \nYou will need to wear compression stockings for three weeks after your procedure, unless your physician instructs you otherwise.       \nActivity       \n - It is important NOT to be sitting or lying down for several hours after surgery. You may begin walking immediately after surgery. This is good for you, but take it easy.       \n - For 2 days after treatment: Avoid aerobic exercise, weight training, and all other types of exertion that increase your breathing and pulse rates.       \n - Do not get a tan for one month after sclerotherapy. Tanning increases your risk of skin discoloration.      \nBathing       \nAfter 24 hours, you may shower or bathe in tepid water, but keep the compression stocking on. Avoid immersion in hot tubs.      \nDiscomfort . For pain or discomfort:       \n - You may take acetaminophen (TylenolTM, Extra-Strength TylenolTM or DatrilTM) as directed.       \n - Do not use aspirin, products containing aspirin, or ibuprofen (for example AdvilTM or MotrinTM) for five days after your surgery, unless approved by your physician.       \n - Dietary restrictions Do not drink alcoholic beverages for two days after your sclerotherapy procedure.       \nPossible side effects following sclerotherapy  After sclerotherapy, mild swelling is expected. The injection sites may also become bruised or gray. You may also experience one or more of the following side effects, which almost always go away within one to four months:       \n - Darkening of the injected veins       \n - Brownish staining of the skin       \n - Small clotted vessels under the surface of the skin that you can feel      \n - Bruising of the injection sites       \nWhat to do about bruising  This will resolve within 2-3 weeks. If you wish the bruising to disappear sooner, then applying Arnicare cream (over the counter, health food stores) will help.       \nWhat to do if you feel small clotted vessels under the surface of the skin.      \n - Call us for a follow up appointment. These small clots can be drained through a small nick.       \n - Draining these small clots will help you heal faster and with less discoloration.      \nWhen to contact your physician       \nContact your physician if you experience any of the following:       \n - Treated areas become increasingly sore, tender, red or warm       \n - Acetaminophen does not relieve your discomfort       \n - Injection sites turn black or the skin around the site breaks down       \n - Ulceration of the injection sites       \n - You develop blisters from the tape       \n - You develop significant swelling or pain in the leg       \n - Darkening of large areas of the skin or foot
Number Of Injections (Sclerosant 2): 0

## 2025-08-07 ENCOUNTER — APPOINTMENT (OUTPATIENT)
Dept: URBAN - METROPOLITAN AREA CLINIC 327 | Facility: CLINIC | Age: 55
Setting detail: DERMATOLOGY
End: 2025-08-07

## 2025-08-07 DIAGNOSIS — I83.9 ASYMPTOMATIC VARICOSE VEINS OF LOWER EXTREMITIES: ICD-10-CM

## 2025-08-07 PROBLEM — I83.93 ASYMPTOMATIC VARICOSE VEINS OF BILATERAL LOWER EXTREMITIES: Status: ACTIVE | Noted: 2025-08-07

## 2025-08-07 PROCEDURE — ? SCLEROTHERAPY (COSMETIC)

## 2025-08-07 ASSESSMENT — LOCATION SIMPLE DESCRIPTION DERM
LOCATION SIMPLE: RIGHT PRETIBIAL REGION
LOCATION SIMPLE: LEFT CALF
LOCATION SIMPLE: LEFT PRETIBIAL REGION
LOCATION SIMPLE: RIGHT CALF

## 2025-08-07 ASSESSMENT — LOCATION ZONE DERM: LOCATION ZONE: LEG
